# Patient Record
Sex: MALE | Race: WHITE | NOT HISPANIC OR LATINO | Employment: OTHER | URBAN - METROPOLITAN AREA
[De-identification: names, ages, dates, MRNs, and addresses within clinical notes are randomized per-mention and may not be internally consistent; named-entity substitution may affect disease eponyms.]

---

## 2022-07-20 ENCOUNTER — OFFICE VISIT (OUTPATIENT)
Dept: FAMILY MEDICINE | Facility: CLINIC | Age: 72
End: 2022-07-20
Payer: MEDICARE

## 2022-07-20 VITALS
DIASTOLIC BLOOD PRESSURE: 67 MMHG | WEIGHT: 229 LBS | SYSTOLIC BLOOD PRESSURE: 117 MMHG | RESPIRATION RATE: 18 BRPM | TEMPERATURE: 98.4 F | OXYGEN SATURATION: 96 % | HEART RATE: 96 BPM

## 2022-07-20 DIAGNOSIS — S90.812A FOOT ABRASION, LEFT, INITIAL ENCOUNTER: Primary | ICD-10-CM

## 2022-07-20 DIAGNOSIS — E11.9 INSULIN DEPENDENT TYPE 2 DIABETES MELLITUS (H): ICD-10-CM

## 2022-07-20 DIAGNOSIS — Z79.4 INSULIN DEPENDENT TYPE 2 DIABETES MELLITUS (H): ICD-10-CM

## 2022-07-20 PROCEDURE — 99203 OFFICE O/P NEW LOW 30 MIN: CPT | Performed by: FAMILY MEDICINE

## 2022-07-20 NOTE — PROGRESS NOTES
Assessment:       Foot abrasion, left, initial encounter    Insulin dependent type 2 diabetes mellitus (H)           Plan:     Insulin-dependent type 2 diabetic with an abrasion on his left foot.  Reassurance given.  No evidence of infection.  Very minimal swelling presently in his foot.  Reassuring that x-ray was normal and I do not think any further imaging is needed at this time.  Follow-up if symptoms getting worse or not improving.        Subjective:       72 year old male with insulin-dependent type 2 diabetes presents for evaluation of an abrasion on his left foot with some mild left foot swelling.  He was make sure he does not have an infection.  He was seen at [facility] 2 days ago.  X-ray was done which was normal.  He was placed on some prednisone for the swelling which did seem to help.  He did injure his left knee a couple of months ago as well walking a lot on vacation.  He thinks the swelling may be due to this.  No other calf swelling or calf pain.  Not having any redness of his foot.  There is been no fevers.    There is no problem list on file for this patient.      No past medical history on file.    No past surgical history on file.    No current outpatient medications on file.     No current facility-administered medications for this visit.       No Known Allergies    No family history on file.    Social History     Socioeconomic History     Marital status:      Spouse name: None     Number of children: None     Years of education: None     Highest education level: None   Tobacco Use     Smoking status: Never Smoker     Smokeless tobacco: Never Used         Review of Systems  Pertinent items are noted in HPI.      Objective:     /67   Pulse 96   Temp 98.4  F (36.9  C) (Oral)   Resp 18   Wt 103.9 kg (229 lb)   SpO2 96%      General appearance: alert, appears stated age and cooperative  Extremities: Patient with small superficial scabbed over abrasion on  the lateral aspect of his left foot.  There is no surrounding erythema.  Very minimal leg swelling.  There is no significant tenderness or warmth.  He is neurovascularly intact.  No calf swelling or tenderness.  Homans' sign negative.          This note has been dictated using voice recognition software. Any grammatical or context distortions are unintentional and inherent to the software

## 2022-07-22 ENCOUNTER — OFFICE VISIT (OUTPATIENT)
Dept: FAMILY MEDICINE | Facility: CLINIC | Age: 72
End: 2022-07-22
Payer: MEDICARE

## 2022-07-22 VITALS
TEMPERATURE: 97.9 F | OXYGEN SATURATION: 96 % | HEART RATE: 93 BPM | RESPIRATION RATE: 24 BRPM | SYSTOLIC BLOOD PRESSURE: 109 MMHG | DIASTOLIC BLOOD PRESSURE: 69 MMHG | WEIGHT: 231.2 LBS

## 2022-07-22 DIAGNOSIS — L08.9 LEFT FOOT INFECTION: Primary | ICD-10-CM

## 2022-07-22 PROCEDURE — 99213 OFFICE O/P EST LOW 20 MIN: CPT | Performed by: EMERGENCY MEDICINE

## 2022-07-22 RX ORDER — LANSOPRAZOLE 30 MG/1
30 CAPSULE, DELAYED RELEASE ORAL
COMMUNITY

## 2022-07-22 RX ORDER — AMOXICILLIN AND CLAVULANATE POTASSIUM 500; 125 MG/1; MG/1
1 TABLET, FILM COATED ORAL 3 TIMES DAILY
Qty: 21 TABLET | Refills: 0 | Status: SHIPPED | OUTPATIENT
Start: 2022-07-22 | End: 2022-07-29

## 2022-07-22 RX ORDER — LISINOPRIL 5 MG/1
5 TABLET ORAL
COMMUNITY
Start: 2022-02-08

## 2022-07-22 RX ORDER — TRAZODONE HYDROCHLORIDE 100 MG/1
TABLET ORAL
COMMUNITY
Start: 2022-04-18

## 2022-07-22 RX ORDER — FUROSEMIDE 20 MG
TABLET ORAL
COMMUNITY
Start: 2022-06-30

## 2022-07-22 RX ORDER — PANTOPRAZOLE SODIUM 20 MG/1
20 TABLET, DELAYED RELEASE ORAL
COMMUNITY
Start: 2022-06-29

## 2022-07-22 RX ORDER — GABAPENTIN 300 MG/1
CAPSULE ORAL
COMMUNITY
Start: 2022-05-10

## 2022-07-22 RX ORDER — CEPHALEXIN 500 MG/1
CAPSULE ORAL
COMMUNITY
Start: 2021-10-12

## 2022-07-22 RX ORDER — DOXYCYCLINE 100 MG/1
CAPSULE ORAL
COMMUNITY
Start: 2022-06-22

## 2022-07-22 RX ORDER — TICAGRELOR 90 MG/1
TABLET ORAL
COMMUNITY
Start: 2021-07-30

## 2022-07-22 RX ORDER — TRAMADOL HYDROCHLORIDE 50 MG/1
TABLET ORAL
COMMUNITY
Start: 2022-05-04

## 2022-07-22 RX ORDER — METHYLPREDNISOLONE 4 MG
TABLET, DOSE PACK ORAL
COMMUNITY
Start: 2022-07-18

## 2022-07-22 RX ORDER — RAMELTEON 8 MG/1
TABLET ORAL
COMMUNITY
Start: 2021-08-02

## 2022-07-22 RX ORDER — LEVALBUTEROL TARTRATE 45 UG/1
AEROSOL, METERED ORAL
COMMUNITY
Start: 2021-12-10

## 2022-07-22 RX ORDER — CLOPIDOGREL BISULFATE 75 MG/1
75 TABLET ORAL DAILY
COMMUNITY
Start: 2022-07-01

## 2022-07-22 RX ORDER — INSULIN DEGLUDEC 200 U/ML
INJECTION, SOLUTION SUBCUTANEOUS
COMMUNITY
Start: 2022-04-20

## 2022-07-22 RX ORDER — TAFAMIDIS 61 MG/1
CAPSULE, LIQUID FILLED ORAL
COMMUNITY
Start: 2022-02-10

## 2022-07-22 RX ORDER — ROSUVASTATIN CALCIUM 20 MG/1
1 TABLET, COATED ORAL DAILY
COMMUNITY
Start: 2022-02-14

## 2022-07-22 RX ORDER — INSULIN LISPRO 200 [IU]/ML
INJECTION, SOLUTION SUBCUTANEOUS
COMMUNITY
Start: 2021-11-03

## 2022-07-22 RX ORDER — PEN NEEDLE, DIABETIC 32 GX 1/4"
NEEDLE, DISPOSABLE MISCELLANEOUS
COMMUNITY
Start: 2022-06-01

## 2022-07-22 RX ORDER — METOPROLOL SUCCINATE 25 MG/1
25 TABLET, EXTENDED RELEASE ORAL
COMMUNITY

## 2022-07-22 RX ORDER — ESCITALOPRAM OXALATE 10 MG/1
TABLET ORAL
COMMUNITY
Start: 2021-11-20

## 2022-07-22 RX ORDER — SACUBITRIL AND VALSARTAN 24; 26 MG/1; MG/1
1 TABLET, FILM COATED ORAL 2 TIMES DAILY
COMMUNITY
Start: 2021-08-25

## 2022-07-22 RX ORDER — RANOLAZINE 500 MG/1
TABLET, EXTENDED RELEASE ORAL
COMMUNITY
Start: 2021-08-20

## 2022-07-22 RX ORDER — LIRAGLUTIDE 6 MG/ML
INJECTION SUBCUTANEOUS
COMMUNITY
Start: 2022-05-08

## 2022-07-22 RX ORDER — DAPAGLIFLOZIN 5 MG/1
5 TABLET, FILM COATED ORAL
COMMUNITY
Start: 2022-01-31

## 2022-07-22 RX ORDER — MULTIVITAMIN,THERAPEUTIC
1 TABLET ORAL DAILY
COMMUNITY

## 2022-07-22 NOTE — PROGRESS NOTES
"Impression:  Swelling and tenderness in the left foot in a patient with diabetes and skin lesions.  Probably has early cellulitis of the foot.    Plan:  Augmentin for 7 days.  Apply Vaseline to the skin lesions to help them heal up.  Elevate the foot.  If the swelling and redness increases he should go to the ER and may need IV antibiotics.  He should follow-up with his primary care doctor soon as he gets back to [place]      Chief Complaint:  Patient presents with:  Musculoskeletal Problem: Lt foot x 3 weeks. Unable to sleep; slept in chair, and swollen. \"Extremely painful sensitive\" and some times radiates towards upper leg. Pt was seen 7/20/22 (see 7/20 encounter)         HPI:   Vlad Dai is a 72 year old male who presents to this clinic for the evaluation of left foot swelling.  Patient complains of left foot swelling on and off for the past 3 weeks.  Its become tender and sensitive to the touch over the past couple days.  There is no redness or fever.  He has diabetes and cardiac amyloidosis and is on treatment for those.  He has some dry skin over the dorsum of the foot and some abrasions on the lateral aspect of the foot.      PMH:   No past medical history on file.  No past surgical history on file.      ROS:  All other systems negative    Meds:    Current Outpatient Medications:      amoxicillin-clavulanate (AUGMENTIN) 500-125 MG tablet, Take 1 tablet by mouth 3 times daily for 7 days, Disp: 21 tablet, Rfl: 0     aspirin (ASA) 81 MG EC tablet, Take 81 mg by mouth, Disp: , Rfl:      BD PEN NEEDLE MICRO U/F 32G X 6 MM miscellaneous, USE FOUR TIMES DAILY AS DIRECTED, Disp: , Rfl:      BRILINTA 90 MG tablet, , Disp: , Rfl:      cephALEXin (KEFLEX) 500 MG capsule, , Disp: , Rfl:      cholecalciferol 50 MCG (2000 UT) CAPS, Take 2,000 Units by mouth, Disp: , Rfl:      clopidogrel (PLAVIX) 75 MG tablet, Take 75 mg by mouth daily, Disp: , Rfl:      dapagliflozin (FARXIGA) 5 MG TABS tablet, Take 5 mg by mouth, " Disp: , Rfl:      diclofenac (VOLTAREN) 1 % topical gel, APPLY TOPICALLY TO THE AFFECTED AREA FOUR TIMES DAILY, Disp: , Rfl:      doxycycline monohydrate (MONODOX) 100 MG capsule, , Disp: , Rfl:      ENTRESTO 24-26 MG per tablet, Take 1 tablet by mouth 2 times daily, Disp: , Rfl:      escitalopram (LEXAPRO) 10 MG tablet, , Disp: , Rfl:      furosemide (LASIX) 20 MG tablet, , Disp: , Rfl:      gabapentin (NEURONTIN) 300 MG capsule, 3 tab po qhs, Disp: , Rfl:      insulin degludec (TRESIBA FLEXTOUCH) 200 UNIT/ML pen, INJECT UP TO 58 UNTIS UNDER THE SKIN EVERY DAY, Disp: , Rfl:      Insulin Lispro (HUMALOG KWIKPEN) 200 UNIT/ML soln, INJECT AS DIRECTED UP TO 50 UNITS DAILY, Disp: , Rfl:      LANsoprazole (PREVACID) 30 MG DR capsule, Take 30 mg by mouth, Disp: , Rfl:      levalbuterol (XOPENEX HFA) 45 MCG/ACT inhaler, INHALE 1 TO 2 PUFFS BY MOUTH EVERY 4 HOURS AS NEEDED FOR SHORTNESS OF BREATH, Disp: , Rfl:      liraglutide (VICTOZA PEN) 18 MG/3ML solution, ADMINISTER 1.8 MG UNDER THE SKIN DAILY, Disp: , Rfl:      lisinopril (ZESTRIL) 5 MG tablet, Take 5 mg by mouth, Disp: , Rfl:      metFORMIN (GLUCOPHAGE) 1000 MG tablet, Take 1,000 mg by mouth, Disp: , Rfl:      methylPREDNISolone (MEDROL DOSEPAK) 4 MG tablet therapy pack, FOLLOW PACKAGE DIRECTIONS, Disp: , Rfl:      metoprolol succinate ER (TOPROL XL) 25 MG 24 hr tablet, Take 25 mg by mouth, Disp: , Rfl:      Multiple Vitamins-Minerals (ONCOVITE) TABS, Take 1 tablet by mouth daily, Disp: , Rfl:      pantoprazole (PROTONIX) 20 MG EC tablet, Take 20 mg by mouth, Disp: , Rfl:      ramelteon (ROZEREM) 8 MG tablet, , Disp: , Rfl:      ranolazine (RANEXA) 500 MG 12 hr tablet, , Disp: , Rfl:      rosuvastatin (CRESTOR) 20 MG tablet, Take 1 tablet by mouth daily, Disp: , Rfl:      tafamidis (VYNDAMAX) 61 MG CAPS capsule, , Disp: , Rfl:      traMADol (ULTRAM) 50 MG tablet, TAKE 1 TABLET BY MOUTH EVERY 6 HOURS AS NEEDED FOR ACUTE PAIN OR PAIN, Disp: , Rfl:      traZODone  (DESYREL) 100 MG tablet, TAKE 1 TABLET(100 MG) BY MOUTH EVERY NIGHT, Disp: , Rfl:         Social:  Social History     Socioeconomic History     Marital status:      Spouse name: Not on file     Number of children: Not on file     Years of education: Not on file     Highest education level: Not on file   Occupational History     Not on file   Tobacco Use     Smoking status: Never Smoker     Smokeless tobacco: Never Used   Substance and Sexual Activity     Alcohol use: Not on file     Drug use: Not on file     Sexual activity: Not on file   Other Topics Concern     Not on file   Social History Narrative     Not on file     Social Determinants of Health     Financial Resource Strain: Not on file   Food Insecurity: Not on file   Transportation Needs: Not on file   Physical Activity: Not on file   Stress: Not on file   Social Connections: Not on file   Intimate Partner Violence: Not on file   Housing Stability: Not on file         Physical Exam:  Vitals:    07/22/22 1013   BP: 109/69   BP Location: Right arm   Patient Position: Sitting   Cuff Size: Adult Regular   Pulse: 93   Resp: 24   Temp: 97.9  F (36.6  C)   TempSrc: Oral   SpO2: 96%   Weight: 104.9 kg (231 lb 3.2 oz)      Patient is awake, alert, no distress  Extremities: There is no calf tenderness cord or Homans' sign.  Left foot shows swelling and skin changes.  Skin: Left foot shows trace edema over the lateral foot.  There are 2 superficial abrasions over the lateral aspect of the foot.  There are dry crusting skin lesions over the dorsum of the foot.  There is very vague erythema.  Neuro: Normal motor and sensory function in all extremities  Psych: Awake, alert, normally responsive      Results:    No results found for this or any previous visit (from the past 24 hour(s)).           Vlad Garcia MD

## 2023-02-12 ENCOUNTER — HEALTH MAINTENANCE LETTER (OUTPATIENT)
Age: 73
End: 2023-02-12

## 2023-05-21 ENCOUNTER — HEALTH MAINTENANCE LETTER (OUTPATIENT)
Age: 73
End: 2023-05-21

## 2023-10-22 ENCOUNTER — HEALTH MAINTENANCE LETTER (OUTPATIENT)
Age: 73
End: 2023-10-22

## 2024-03-10 ENCOUNTER — HEALTH MAINTENANCE LETTER (OUTPATIENT)
Age: 74
End: 2024-03-10

## 2024-07-28 ENCOUNTER — HEALTH MAINTENANCE LETTER (OUTPATIENT)
Age: 74
End: 2024-07-28

## 2024-12-15 ENCOUNTER — HEALTH MAINTENANCE LETTER (OUTPATIENT)
Age: 74
End: 2024-12-15

## 2025-03-16 ENCOUNTER — HEALTH MAINTENANCE LETTER (OUTPATIENT)
Age: 75
End: 2025-03-16

## 2025-06-29 ENCOUNTER — HEALTH MAINTENANCE LETTER (OUTPATIENT)
Age: 75
End: 2025-06-29